# Patient Record
Sex: FEMALE | Race: WHITE | NOT HISPANIC OR LATINO | Employment: OTHER | URBAN - METROPOLITAN AREA
[De-identification: names, ages, dates, MRNs, and addresses within clinical notes are randomized per-mention and may not be internally consistent; named-entity substitution may affect disease eponyms.]

---

## 2018-09-24 ENCOUNTER — OFFICE VISIT CONVERTED (OUTPATIENT)
Dept: ORTHOPEDIC SURGERY | Facility: CLINIC | Age: 73
End: 2018-09-24
Attending: ORTHOPAEDIC SURGERY

## 2018-10-15 ENCOUNTER — OFFICE VISIT CONVERTED (OUTPATIENT)
Dept: ORTHOPEDIC SURGERY | Facility: CLINIC | Age: 73
End: 2018-10-15
Attending: ORTHOPAEDIC SURGERY

## 2018-10-15 ENCOUNTER — CONVERSION ENCOUNTER (OUTPATIENT)
Dept: ORTHOPEDIC SURGERY | Facility: CLINIC | Age: 73
End: 2018-10-15

## 2018-11-29 ENCOUNTER — OFFICE VISIT CONVERTED (OUTPATIENT)
Dept: ORTHOPEDIC SURGERY | Facility: CLINIC | Age: 73
End: 2018-11-29
Attending: ORTHOPAEDIC SURGERY

## 2018-12-31 ENCOUNTER — CONVERSION ENCOUNTER (OUTPATIENT)
Dept: ORTHOPEDIC SURGERY | Facility: CLINIC | Age: 73
End: 2018-12-31

## 2018-12-31 ENCOUNTER — OFFICE VISIT CONVERTED (OUTPATIENT)
Dept: ORTHOPEDIC SURGERY | Facility: CLINIC | Age: 73
End: 2018-12-31
Attending: ORTHOPAEDIC SURGERY

## 2019-02-11 ENCOUNTER — OFFICE VISIT CONVERTED (OUTPATIENT)
Dept: ORTHOPEDIC SURGERY | Facility: CLINIC | Age: 74
End: 2019-02-11
Attending: ORTHOPAEDIC SURGERY

## 2019-05-13 ENCOUNTER — OFFICE VISIT CONVERTED (OUTPATIENT)
Dept: ORTHOPEDIC SURGERY | Facility: CLINIC | Age: 74
End: 2019-05-13
Attending: ORTHOPAEDIC SURGERY

## 2019-09-24 ENCOUNTER — HOSPITAL ENCOUNTER (OUTPATIENT)
Dept: GENERAL RADIOLOGY | Facility: HOSPITAL | Age: 74
Discharge: HOME | End: 2019-09-24
Attending: INTERNAL MEDICINE

## 2019-11-12 ENCOUNTER — OFFICE VISIT CONVERTED (OUTPATIENT)
Dept: ORTHOPEDIC SURGERY | Facility: CLINIC | Age: 74
End: 2019-11-12
Attending: ORTHOPAEDIC SURGERY

## 2020-11-12 ENCOUNTER — CONVERSION ENCOUNTER (OUTPATIENT)
Dept: ORTHOPEDIC SURGERY | Facility: CLINIC | Age: 75
End: 2020-11-12

## 2020-11-12 ENCOUNTER — OFFICE VISIT CONVERTED (OUTPATIENT)
Dept: ORTHOPEDIC SURGERY | Facility: CLINIC | Age: 75
End: 2020-11-12
Attending: ORTHOPAEDIC SURGERY

## 2021-04-12 ENCOUNTER — OFFICE VISIT CONVERTED (OUTPATIENT)
Dept: CARDIOLOGY | Facility: CLINIC | Age: 76
End: 2021-04-12
Attending: INTERNAL MEDICINE

## 2021-05-11 NOTE — H&P
History and Physical      Patient Name: Tanya Muñoz   Patient ID: [id number]   Sex: Female   YOB: 1945    Referring Provider: Sirena BORJAS    Visit Date: April 12, 2021    Provider: Keyur Chambers MD   Location: [facility]   Location Address: [address] [place] [address], [facility]   [place], [place]  [id number]   Location Phone: [phone]          Chief Complaint     Murmur and regurgitation.       History Of Present Illness  Consult requested by: Sirena BORJAS   Tanya Muñoz is a 75 year old /White female with a history of hypothyroidism who has been having some fatigue issues. She has recently been increased on her Synthroid. She was noted to have a murmur on auscultation. The patient symptom-wise does not have any shortness of breath other than with activity. No PND or orthopnea. No chest pain problems. She has occasional heart palpitations. No syncope or presyncopal spells.   PAST MEDICAL HISTORY: Significant for hypothyroidism, hyperlipidemia, asthma, psoriasis, and osteoporosis.   FAMILY MEDICAL HISTORY: Nonsignificant for premature coronary atherosclerotic disease.   PSYCHOSOCIAL HISTORY: Denies alcohol use. Denies tobacco use. Caffeine use is 1 cup per day of coffee.   CURRENT MEDICATIONS: Levothyroxine 88 mcg daily; mometasone spray p.r.n.; Advair 100/50 as needed; simvastatin 10 mg daily; montelukast 10 mg daily; cetirizine 10 mg daily; trazodone 25 mg p.r.n.; folic acid 1 mg daily; calcium 600 mg with vitamin D 800 daily; Tylenol 650 b.i.d.   ALLERGIES: Sulfa.       Review of Systems  · Constitutional  o Admits  o : good general health lately  o Denies  o : fatigue, recent weight changes   · Eyes  o Denies  o : double vision  · HENT  o Admits  o : hearing loss or ringing  o Denies  o : chronic sinus problem, swollen glands in neck  · Cardiovascular  o Admits  o : palpitations (fast, fluttering, or skipping beats), shortness of breath while walking or lying  "flat  o Denies  o : chest pain, swelling (feet, ankles, hands)  · Respiratory  o Admits  o : chronic or frequent cough, asthma or wheezing  o Denies  o : COPD  · Gastrointestinal  o Denies  o : ulcers, nausea or vomiting  · Neurologic  o Denies  o : lightheaded or dizzy, stroke, headaches  · Musculoskeletal  o Admits  o : joint pain, back pain  · Endocrine  o Admits  o : thyroid disease  o Denies  o : diabetes, heat or cold intolerance, excessive thirst or urination  · Heme-Lymph  o Denies  o : bleeding or bruising tendency, anemia      Vitals  Date Time BP Position Site L\R Cuff Size HR RR TEMP (F) WT  HT  BMI kg/m2 BSA m2 O2 Sat FR L/min FiO2 HC       04/12/2021 09:57 /140 Sitting    54 - R   152lbs 0oz 5'  4\" 26.09 1.76       04/12/2021 09:57 /54 Sitting                       Physical Examination  · Constitutional  o Appearance  o : Awake, alert, in no acute distress.   · Head and Face  o HEENT  o : PERRLA.  · Eyes  o Conjunctivae  o : Normal.  · Ears, Nose, Mouth and Throat  o Oral Cavity  o :   § Oral Mucosa  § : Normal.  · Neck  o Inspection/Palpation  o : No JVD.  · Respiratory  o Respiratory  o : Chest is symmetrical. Lung fields are clear. No rhonchi or wheezes heard.  · Cardiovascular  o Heart  o :   § Auscultation of Heart  § : 1/6 systolic murmur best heard at the left lower sternal border.   o Peripheral Vascular System  o :   § Extremities  § : Nails normal. No clubbing or cyanosis. Femoral pulses adequate. Pedal pulses adequate. No peripheral edema.  · Gastrointestinal  o Abdominal Examination  o : Abdomen soft. No masses. No guarding or rigidity. No hepatosplenomegaly. Bowel sounds normal.  · Musculoskeletal  o General  o :   § General Musculoskeletal  § : Muscle tone and strength were normal.  · Skin and Subcutaneous Tissue  o General Inspection  o : Unremarkable.  · EKG  o EKG  o : Performed in the office today.   o Indications  o : Heart murmur.  o Results  o : Sinus bradycardia but " otherwise normal.   · Labs  o Labs  o : Creatinine 0.7, potassium 4.4, C-reactive protein 3.6, TSH 2.03.  · Echocardiogram  o Echocardiogram  o : Her echocardiogram results were reviewed by me and showed a normal ejection fraction of 69% with mild biatrial enlargement, mild to moderate aortic regurgitation, and moderate tricuspid regurgitation with normal estimated PA pressures.           Assessment     ASSESSMENT & PLAN:    1.  Aortic insufficiency, mild to moderate in severity, no overt symptoms to suggest any CHF.  Given the        severity would just repeat an echocardiogram in 3-5 years unless any other change in symptoms.   2.  Tricuspid regurgitation, moderate in severity with normal estimated PA pressures and no significant right        ventricular enlargement.  Would just repeat echocardiogram in 3-5 years. I reassured the patient of the        likely benign nature.   3.  Hypertension, likely situational and white coat related.  Previous blood pressures in PCP's office have been        in normal range.  Recommended the patient just keep track with a home blood pressure monitoring log for        review.   4.  Mild biatrial enlargement.  No clinical evidence of CHF either symptomatically or on her exam today.  Will        get a baseline proBNP level and recommend close monitoring of the patient's blood pressure.     Keyur Chambers MD  JH/rt                Electronically Signed by: Portia Ferro-, Other -Author on April 17, 2021 04:09:13 PM  Electronically Co-signed by: Keyur Chambers MD -Reviewer on April 26, 2021 09:48:48 AM

## 2021-05-13 NOTE — PROGRESS NOTES
Progress Note      Patient Name: Tanya Mñuoz   Patient ID: [id number]   Sex: Female   YOB: 1945        Visit Date: November 12, 2020    Provider: Keyur Wood MD   Location: [facility]   Location Address: [address] [place] [address]  [id number]   Location Phone: [phone]          Chief Complaint  · Left knee pain       History Of Present Illness  Tanya Muñoz is a 75 year old /White female who presents today to [facility].        The patient presents today for follow-up of the left knee replacement performed 11/14/2018. She denies knee pain today and has been doing well. She has been visiting with her family some. She does report recently passing a marble feeling of the knee with flexion.          Past Medical History  Aftercare;following joint replacement, left knee; Arthritis; Asthma; Hyperlipemia; Primary osteoarthritis of knees, bilateral; Primary osteoarthritis of left knee; Right knee pain; Thyroid disorder         Past Surgical History  Colonoscopy; Eye Implant         Medication List  diclofenac sodium 75 mg oral tablet,delayed release (DR/EC); levothyroxine 50 mcg oral tablet; methotrexate sodium 2.5 mg oral tablet; montelukast 10 mg oral tablet; omeprazole 10 mg oral capsule,delayed release(DR/EC); Tylenol 325 mg oral capsule         Allergy List  SULFA (SULFONAMIDES)       Allergies Reconciled  Family Medical History  Stroke; Cancer, Unspecified; Diabetes, unspecified type; Blood Diseases; Family history of certain chronic disabling diseases; arthritis         Social History  Alcohol Use (Never); Homemaker.; lives with spouse; .; Recreational Drug Use (Never); Retired.; Tobacco (Never)         Review of Systems  · Constitutional  o Denies  o : fever, chills, weight loss  · Cardiovascular  o Denies  o : chest pain, shortness of breath  · Gastrointestinal  o Denies  o : liver disease, heartburn, nausea, blood in  "stools  · Genitourinary  o Denies  o : painful urination, blood in urine  · Integument  o Denies  o : rash, itching  · Neurologic  o Denies  o : headache, weakness, loss of consciousness  · Musculoskeletal  o Denies  o : painful, swollen joints  · Psychiatric  o Denies  o : drug/alcohol addiction, anxiety, depression      Vitals  Date Time BP Position Site L\R Cuff Size HR RR TEMP (F) WT  HT  BMI kg/m2 BSA m2 O2 Sat FR L/min FiO2        11/12/2020 11:11 AM      86 - R   157lbs 6oz 5'  4.5\" 26.6 1.8 98 %            Physical Examination  · Constitutional  o Appearance  o : well developed, well-nourished, no obvious deformities present  · Head and Face  o Head  o :   § Inspection  § : normocephalic  o Face  o :   § Inspection  § : no facial lesions  · Eyes  o Conjunctivae  o : conjunctivae normal  o Sclerae  o : sclerae white  · Ears, Nose, Mouth and Throat  o Ears  o :   § External Ears  § : appearance within normal limits  § Hearing  § : intact  o Nose  o :   § External Nose  § : appearance normal  · Neck  o Inspection/Palpation  o : normal appearance  o Range of Motion  o : full range of motion  · Respiratory  o Respiratory Effort  o : breathing unlabored  o Inspection of Chest  o : normal appearance  o Auscultation of Lungs  o : no audible wheezing or rales  · Cardiovascular  o Heart  o : regular rate  · Gastrointestinal  o Abdominal Examination  o : soft and non-tender  · Skin and Subcutaneous Tissue  o General Inspection  o : intact, no rashes  · Psychiatric  o General  o : Alert and oriented x3  o Judgement and Insight  o : judgment and insight intact  o Mood and Affect  o : mood normal, affect appropriate  · Left Knee  o Inspection  o : ROM -3 to 115 degrees of flexion, incision well-healed, intact ankle ROM, no skin discoloration, no atrophy, no swelling, Neurovascularly intact, sensation intact, Non-tender, ambulates with minimal antalgic gait  · In Office Procedures  o View  o : " AP/LATERAL/SUNRISE  o Site  o : left, knee  o Indication  o : Left knee pain  o Study  o : X-rays ordered, taken in the office, and reviewed today.  o Xray  o : reveals an intact appearing left knee replacement, no evidence of subsidence, loosening or periprosthetic fracture  o Comparative Data  o : Comparative Data found and reviewed today           Assessment  · Aftercare;following joint replacement     V54.81/Z47.1  · Left knee pain, unspecified chronicity     719.46/M25.562      Plan  · Orders  o Knee (Left) [facility] ([id number]) - 719.46/M25.562 - 11/12/2020  · Medications  o Medications have been Reconciled  o Transition of Care or Provider Policy  · Instructions  o Reviewed the patient's Past Medical, Social, and Family history as well as the ROS at today's visit, no changes.  o Call or return if worsening symptoms.  o X-ray ordered, taken and reviewed at this visit.  o The above service was scribed by Mary Calvillo on my behalf and I attest to the accuracy of the note. abyb  o We recommended she follow-up with us in about two years to recheck the knee. If any problems she will follow-up sooner.             Electronically Signed by: Mary Calvillo-, Other -Author on November 12, 2020 11:30:42 AM  Electronically Co-signed by: Keyur Wood MD -Reviewer on November 12, 2020 09:08:51 PM

## 2021-05-14 VITALS — WEIGHT: 157.37 LBS | BODY MASS INDEX: 26.87 KG/M2 | HEIGHT: 64 IN | HEART RATE: 86 BPM | OXYGEN SATURATION: 98 %

## 2021-05-14 VITALS
WEIGHT: 152 LBS | HEIGHT: 64 IN | BODY MASS INDEX: 25.95 KG/M2 | HEART RATE: 54 BPM | SYSTOLIC BLOOD PRESSURE: 162 MMHG | DIASTOLIC BLOOD PRESSURE: 140 MMHG

## 2021-05-15 VITALS — BODY MASS INDEX: 26.8 KG/M2 | HEART RATE: 63 BPM | WEIGHT: 157 LBS | HEIGHT: 64 IN | OXYGEN SATURATION: 99 %

## 2021-05-15 VITALS — WEIGHT: 160 LBS | HEIGHT: 64 IN | HEART RATE: 70 BPM | BODY MASS INDEX: 27.31 KG/M2 | OXYGEN SATURATION: 98 %

## 2021-05-15 VITALS — WEIGHT: 158.37 LBS | HEIGHT: 64 IN | HEART RATE: 72 BPM | BODY MASS INDEX: 27.04 KG/M2 | OXYGEN SATURATION: 98 %

## 2021-05-15 VITALS — OXYGEN SATURATION: 98 % | HEART RATE: 80 BPM | WEIGHT: 162 LBS | HEIGHT: 64 IN | BODY MASS INDEX: 27.66 KG/M2

## 2021-05-16 VITALS — HEIGHT: 64 IN | BODY MASS INDEX: 27.36 KG/M2 | OXYGEN SATURATION: 98 % | HEART RATE: 63 BPM | WEIGHT: 160.25 LBS

## 2021-05-16 VITALS — BODY MASS INDEX: 44.39 KG/M2 | HEIGHT: 64 IN | WEIGHT: 260 LBS | OXYGEN SATURATION: 96 %

## 2021-05-16 VITALS — HEIGHT: 64 IN | HEART RATE: 85 BPM | OXYGEN SATURATION: 96 %

## 2022-07-12 PROBLEM — E78.5 HYPERLIPEMIA: Status: ACTIVE | Noted: 2022-07-12

## 2022-07-12 PROBLEM — I10 ESSENTIAL HYPERTENSION: Status: ACTIVE | Noted: 2022-07-12

## 2022-07-12 PROBLEM — I35.1 NONRHEUMATIC AORTIC VALVE INSUFFICIENCY: Status: ACTIVE | Noted: 2022-07-12

## 2022-07-13 ENCOUNTER — PREP FOR SURGERY (OUTPATIENT)
Dept: OTHER | Facility: HOSPITAL | Age: 77
End: 2022-07-13

## 2022-07-13 ENCOUNTER — OFFICE VISIT (OUTPATIENT)
Dept: CARDIOLOGY | Facility: CLINIC | Age: 77
End: 2022-07-13

## 2022-07-13 VITALS
HEART RATE: 86 BPM | SYSTOLIC BLOOD PRESSURE: 138 MMHG | WEIGHT: 126 LBS | BODY MASS INDEX: 21.51 KG/M2 | DIASTOLIC BLOOD PRESSURE: 81 MMHG | HEIGHT: 64 IN

## 2022-07-13 DIAGNOSIS — I10 ESSENTIAL HYPERTENSION: ICD-10-CM

## 2022-07-13 DIAGNOSIS — I48.19 ATRIAL FIBRILLATION, PERSISTENT: Primary | ICD-10-CM

## 2022-07-13 DIAGNOSIS — I35.1 NONRHEUMATIC AORTIC VALVE INSUFFICIENCY: ICD-10-CM

## 2022-07-13 DIAGNOSIS — E78.5 HYPERLIPIDEMIA, UNSPECIFIED HYPERLIPIDEMIA TYPE: ICD-10-CM

## 2022-07-13 PROBLEM — I48.0 PAROXYSMAL ATRIAL FIBRILLATION: Status: ACTIVE | Noted: 2022-07-13

## 2022-07-13 PROCEDURE — 93000 ELECTROCARDIOGRAM COMPLETE: CPT | Performed by: INTERNAL MEDICINE

## 2022-07-13 PROCEDURE — 99214 OFFICE O/P EST MOD 30 MIN: CPT | Performed by: INTERNAL MEDICINE

## 2022-07-13 RX ORDER — MOMETASONE FUROATE 50 UG/1
2 SPRAY, METERED NASAL DAILY
COMMUNITY

## 2022-07-13 RX ORDER — DILTIAZEM HYDROCHLORIDE 120 MG/1
120 TABLET, FILM COATED ORAL DAILY
COMMUNITY
End: 2022-08-10

## 2022-07-13 RX ORDER — WARFARIN SODIUM 5 MG/1
5 TABLET ORAL
COMMUNITY
End: 2023-02-23 | Stop reason: ALTCHOICE

## 2022-07-13 RX ORDER — MONTELUKAST SODIUM 10 MG/1
10 TABLET ORAL NIGHTLY
COMMUNITY

## 2022-07-13 RX ORDER — METOPROLOL SUCCINATE 25 MG/1
25 TABLET, EXTENDED RELEASE ORAL DAILY
COMMUNITY
End: 2023-02-23

## 2022-07-13 RX ORDER — TRAZODONE HYDROCHLORIDE 50 MG/1
50 TABLET ORAL NIGHTLY
COMMUNITY

## 2022-07-13 RX ORDER — CETIRIZINE HYDROCHLORIDE 10 MG/1
10 TABLET ORAL DAILY PRN
COMMUNITY

## 2022-07-13 RX ORDER — SIMVASTATIN 10 MG
10 TABLET ORAL NIGHTLY
COMMUNITY

## 2022-07-13 RX ORDER — FLUTICASONE PROPIONATE AND SALMETEROL 100; 50 UG/1; UG/1
POWDER RESPIRATORY (INHALATION) AS NEEDED
COMMUNITY
End: 2022-08-10

## 2022-07-13 RX ORDER — LEVOTHYROXINE SODIUM 0.07 MG/1
75 TABLET ORAL DAILY
COMMUNITY

## 2022-07-13 NOTE — PROGRESS NOTES
Chief Complaint  Atrial Fibrillation    Subjective    Patient is a 76-year-old with a history of hypertension dyslipidemia previous asthma who had had some respiratory issues with cough wheezing shortness of breath have been treated and not had improvement with back to see the PCP was noted to be tachycardic and a new onset of atrial fibrillation admitted to the hospital and treated for 2 days the patient was discharged a few weeks later she went back to the emergency room due to low heart rate as low as 39 and her diltiazem was adjusted.  Past Medical History:   Diagnosis Date   • Atrial fibrillation (HCC)    • Hyperlipidemia          Current Outpatient Medications:   •  cetirizine (zyrTEC) 10 MG tablet, Take 10 mg by mouth Daily As Needed for Allergies., Disp: , Rfl:   •  dilTIAZem (CARDIZEM) 120 MG tablet, Take 120 mg by mouth Daily., Disp: , Rfl:   •  levothyroxine (SYNTHROID, LEVOTHROID) 75 MCG tablet, Take 75 mcg by mouth Daily., Disp: , Rfl:   •  metoprolol succinate XL (TOPROL-XL) 25 MG 24 hr tablet, Take 25 mg by mouth Daily., Disp: , Rfl:   •  mometasone (Nasonex) 50 MCG/ACT nasal spray, 2 sprays into the nostril(s) as directed by provider Daily., Disp: , Rfl:   •  montelukast (SINGULAIR) 10 MG tablet, Take 10 mg by mouth Every Night., Disp: , Rfl:   •  simvastatin (ZOCOR) 10 MG tablet, Take 10 mg by mouth Every Night., Disp: , Rfl:   •  traZODone (DESYREL) 50 MG tablet, Take 50 mg by mouth Every Night., Disp: , Rfl:   •  warfarin (COUMADIN) 5 MG tablet, Take 5 mg by mouth Daily., Disp: , Rfl:   •  Ergocalciferol (VITAMIN D2 PO), Take 1.5 mg by mouth 1 (One) Time Per Week., Disp: , Rfl:   •  Fluticasone-Salmeterol (ADVAIR) 100-50 MCG/ACT DISKUS, Inhale As Needed., Disp: , Rfl:     There are no discontinued medications.  Allergies   Allergen Reactions   • Sulfa Antibiotics Unknown - High Severity        Social History     Tobacco Use   • Smokeless tobacco: Never Used   Vaping Use   • Vaping Use: Never used  "  Substance Use Topics   • Drug use: Never       Family History   Problem Relation Age of Onset   • Heart attack Brother    • Stroke Brother    • Heart disease Maternal Grandfather         Objective     /81   Pulse 86   Ht 162.6 cm (64\")   Wt 57.2 kg (126 lb)   BMI 21.63 kg/m²       Physical Exam    General Appearance:   · no acute distress  · Alert and oriented x3  HENT:   · lips not cyanotic  · Atraumatic  Neck:  · No jvd   · supple  Respiratory:  · no respiratory distress  · normal breath sounds  · no rales  Cardiovascular:  · Irregular irregular  · no S3, no S4   · 1/6 systolic murmur  · no rub  Extremities  · No cyanosis  · lower extremity edema: Trace  Skin:   · warm, dry  · No rashes      Result Review :     No results found for: PROBNP           No results found for: POCTROP                     ECG 12 Lead    Date/Time: 7/13/2022 4:03 PM  Performed by: Keyur Chambers MD  Authorized by: Keyur Chambers MD   Comparison: compared with previous ECG   Similar to previous ECG  Rhythm: atrial fibrillation  Comments: Borderline low voltage                   Diagnoses and all orders for this visit:    1. Atrial fibrillation, persistent (HCC) (Primary)  Assessment & Plan:  Patient with new onset of persistent atrial fibrillation some issues with bradycardia improved since her dose of diltiazem was decreased recommended a trial of cardioversion both to see if she is symptomatically improved as well as to see if her rate control is easier back in normal sinus rhythm.  She is on Coumadin and been anticoagulated we will schedule for 2 weeks as this will complete over 4 weeks of therapy and not require CELESTE prior to cardioversion.  Discussed risk benefits and alternatives including the risk for stroke respiratory sedation patient was agreeable to proceeding      2. Nonrheumatic aortic valve insufficiency    3. Hyperlipidemia, unspecified hyperlipidemia type    4. Essential hypertension  Assessment & " Plan:  Well-controlled continue on Toprol 25 and diltiazem 120 mg once      Other orders  -     ECG 12 Lead          Follow Up     No follow-ups on file.          Patient was given instructions and counseling regarding her condition or for health maintenance advice. Please see specific information pulled into the AVS if appropriate.

## 2022-08-01 ENCOUNTER — HOSPITAL ENCOUNTER (OUTPATIENT)
Dept: INFUSION THERAPY | Facility: HOSPITAL | Age: 77
Discharge: HOME | End: 2022-08-01
Attending: INTERNAL MEDICINE | Admitting: INTERNAL MEDICINE

## 2022-08-01 VITALS
RESPIRATION RATE: 18 BRPM | HEART RATE: 50 BPM | OXYGEN SATURATION: 95 % | TEMPERATURE: 97.9 F | DIASTOLIC BLOOD PRESSURE: 60 MMHG | SYSTOLIC BLOOD PRESSURE: 101 MMHG

## 2022-08-01 DIAGNOSIS — I48.19 ATRIAL FIBRILLATION, PERSISTENT: ICD-10-CM

## 2022-08-01 LAB
ANION GAP SERPL CALCULATED.3IONS-SCNC: 5.9 MMOL/L (ref 5–15)
BUN SERPL-MCNC: 13 MG/DL (ref 8–23)
BUN/CREAT SERPL: 18.6 (ref 7–25)
CALCIUM SPEC-SCNC: 9.5 MG/DL (ref 8.6–10.5)
CHLORIDE SERPL-SCNC: 108 MMOL/L (ref 98–107)
CO2 SERPL-SCNC: 28.1 MMOL/L (ref 22–29)
CREAT SERPL-MCNC: 0.7 MG/DL (ref 0.57–1)
EGFRCR SERPLBLD CKD-EPI 2021: 89.8 ML/MIN/1.73
GLUCOSE SERPL-MCNC: 89 MG/DL (ref 65–99)
INR PPP: 2.74 (ref 0.86–1.15)
POTASSIUM SERPL-SCNC: 4.2 MMOL/L (ref 3.5–5.2)
PROTHROMBIN TIME: 29.6 SECONDS (ref 11.8–14.9)
SODIUM SERPL-SCNC: 142 MMOL/L (ref 136–145)

## 2022-08-01 PROCEDURE — 99153 MOD SED SAME PHYS/QHP EA: CPT

## 2022-08-01 PROCEDURE — 99152 MOD SED SAME PHYS/QHP 5/>YRS: CPT

## 2022-08-01 PROCEDURE — 85610 PROTHROMBIN TIME: CPT | Performed by: INTERNAL MEDICINE

## 2022-08-01 PROCEDURE — 93005 ELECTROCARDIOGRAM TRACING: CPT | Performed by: INTERNAL MEDICINE

## 2022-08-01 PROCEDURE — 92960 CARDIOVERSION ELECTRIC EXT: CPT | Performed by: INTERNAL MEDICINE

## 2022-08-01 PROCEDURE — 80048 BASIC METABOLIC PNL TOTAL CA: CPT | Performed by: INTERNAL MEDICINE

## 2022-08-01 PROCEDURE — 25010000002 MIDAZOLAM PER 1 MG: Performed by: INTERNAL MEDICINE

## 2022-08-01 PROCEDURE — 92960 CARDIOVERSION ELECTRIC EXT: CPT

## 2022-08-01 RX ORDER — MIDAZOLAM HYDROCHLORIDE 1 MG/ML
2 INJECTION INTRAMUSCULAR; INTRAVENOUS AS NEEDED
Status: DISCONTINUED | OUTPATIENT
Start: 2022-08-01 | End: 2022-08-01 | Stop reason: HOSPADM

## 2022-08-01 RX ORDER — MORPHINE SULFATE 2 MG/ML
2 INJECTION, SOLUTION INTRAMUSCULAR; INTRAVENOUS AS NEEDED
Status: DISCONTINUED | OUTPATIENT
Start: 2022-08-01 | End: 2022-08-01 | Stop reason: HOSPADM

## 2022-08-01 RX ORDER — FLUMAZENIL 0.1 MG/ML
0.2 INJECTION INTRAVENOUS ONCE
Status: COMPLETED | OUTPATIENT
Start: 2022-08-01 | End: 2022-08-01

## 2022-08-01 RX ADMIN — MIDAZOLAM HYDROCHLORIDE 2 MG: 1 INJECTION, SOLUTION INTRAMUSCULAR; INTRAVENOUS at 08:21

## 2022-08-01 RX ADMIN — SODIUM CHLORIDE 500 ML: 9 INJECTION, SOLUTION INTRAVENOUS at 08:56

## 2022-08-01 RX ADMIN — FLUMAZENIL 0.2 MG: 0.1 INJECTION, SOLUTION INTRAVENOUS at 08:37

## 2022-08-01 NOTE — PROCEDURES
[facility]   CARDIOVERSION PROCEDURE REPORT      Patient: Tanya Muñoz  : 1945  MRN: [id number]    Procedure Date:  22          Indication:  1. Paroxysmal atrial fibrillation, symptomatic    Procedure performed: The patient signed a written informed consent and was brought to a monitored telemetry bed. They were given IV conscious sedation per the nursing notes. They underwent synchronized DC cardioversion with 200 joules which was successful in converting to normal sinus rhythm. The patient was monitored afterwards for an uneventful course. They were no immediate complications.      Conclusions: Successful DC cardioversion    Recommendations:   1. Continue with her current rate control medications patient can follow-up as outpatient.        Keyur Chambers MD    22  08:36 EDT

## 2022-08-10 ENCOUNTER — OFFICE VISIT (OUTPATIENT)
Dept: CARDIOLOGY | Facility: CLINIC | Age: 77
End: 2022-08-10

## 2022-08-10 VITALS
BODY MASS INDEX: 21.03 KG/M2 | HEIGHT: 64 IN | SYSTOLIC BLOOD PRESSURE: 147 MMHG | HEART RATE: 58 BPM | DIASTOLIC BLOOD PRESSURE: 52 MMHG | WEIGHT: 123.2 LBS

## 2022-08-10 DIAGNOSIS — E78.5 HYPERLIPIDEMIA, UNSPECIFIED HYPERLIPIDEMIA TYPE: ICD-10-CM

## 2022-08-10 DIAGNOSIS — I10 ESSENTIAL HYPERTENSION: ICD-10-CM

## 2022-08-10 DIAGNOSIS — I35.1 NONRHEUMATIC AORTIC VALVE INSUFFICIENCY: ICD-10-CM

## 2022-08-10 DIAGNOSIS — I48.0 PAROXYSMAL ATRIAL FIBRILLATION: Primary | ICD-10-CM

## 2022-08-10 PROCEDURE — 99214 OFFICE O/P EST MOD 30 MIN: CPT | Performed by: INTERNAL MEDICINE

## 2022-08-10 RX ORDER — BUDESONIDE AND FORMOTEROL FUMARATE DIHYDRATE 80; 4.5 UG/1; UG/1
2 AEROSOL RESPIRATORY (INHALATION)
COMMUNITY

## 2022-08-10 NOTE — ASSESSMENT & PLAN NOTE
Maintaining normal sinus rhythm but still with fatigability improving mildly recommended titrating down on her Toprol to 12.5 and continue to encourage exercise as tolerated check EKG on follow-up

## 2022-08-10 NOTE — ASSESSMENT & PLAN NOTE
Blood pressure on her home log well-controlled titrate down on metoprolol to see if this improves with her fatigue symptoms

## 2022-08-10 NOTE — PROGRESS NOTES
Chief Complaint  Atrial Fibrillation, Hyperlipidemia, and Hypertension    Subjective    Patient is status post cardioversion maintaining normal sinus rhythm on EKG today but has persistently had some fatigue and not much change in her exertional capacity although it is mildly improved    Past Medical History:   Diagnosis Date   • Asthma    • Atrial fibrillation (HCC)    • Disease of thyroid gland    • Hyperlipidemia          Current Outpatient Medications:   •  budesonide-formoterol (Symbicort) 80-4.5 MCG/ACT inhaler, Inhale 2 puffs 2 (Two) Times a Day., Disp: , Rfl:   •  cetirizine (zyrTEC) 10 MG tablet, Take 10 mg by mouth Daily As Needed for Allergies., Disp: , Rfl:   •  Ergocalciferol (VITAMIN D2 PO), Take 1.5 mg by mouth 1 (One) Time Per Week., Disp: , Rfl:   •  levothyroxine (SYNTHROID, LEVOTHROID) 75 MCG tablet, Take 75 mcg by mouth Daily., Disp: , Rfl:   •  metoprolol succinate XL (TOPROL-XL) 25 MG 24 hr tablet, Take 25 mg by mouth Daily., Disp: , Rfl:   •  mometasone (NASONEX) 50 MCG/ACT nasal spray, 2 sprays into the nostril(s) as directed by provider Daily., Disp: , Rfl:   •  montelukast (SINGULAIR) 10 MG tablet, Take 10 mg by mouth Every Night., Disp: , Rfl:   •  simvastatin (ZOCOR) 10 MG tablet, Take 10 mg by mouth Every Night., Disp: , Rfl:   •  traZODone (DESYREL) 50 MG tablet, Take 50 mg by mouth Every Night., Disp: , Rfl:   •  warfarin (COUMADIN) 5 MG tablet, Take 5 mg by mouth Daily., Disp: , Rfl:     Medications Discontinued During This Encounter   Medication Reason   • dilTIAZem (CARDIZEM) 120 MG tablet *Therapy completed   • Fluticasone-Salmeterol (ADVAIR) 100-50 MCG/ACT DISKUS *Therapy completed     Allergies   Allergen Reactions   • Sulfa Antibiotics Rash        Social History     Tobacco Use   • Smoking status: Never Smoker   • Smokeless tobacco: Never Used   Vaping Use   • Vaping Use: Never used   Substance Use Topics   • Alcohol use: Never   • Drug use: Never       Family History   Problem  "Relation Age of Onset   • Heart attack Brother    • Stroke Brother    • Heart disease Maternal Grandfather         Objective     /52   Pulse 58   Ht 162.6 cm (64\")   Wt 55.9 kg (123 lb 3.2 oz)   BMI 21.15 kg/m²       Physical Exam    General Appearance:   · no acute distress  · Alert and oriented x3  HENT:   · lips not cyanotic  · Atraumatic  Neck:  · No jvd   · supple  Respiratory:  · no respiratory distress  · normal breath sounds  · no rales  Cardiovascular:  · Regular rate and rhythm  · no S3, no S4   · no murmur  · no rub  Extremities  · No cyanosis  · lower extremity edema: none    Skin:   · warm, dry  · No rashes      Result Review :     No results found for: PROBNP  CMP    CMP 8/1/22   Glucose 89   BUN 13   Creatinine 0.70   Sodium 142   Potassium 4.2   Chloride 108 (A)   Calcium 9.5   (A) Abnormal value               No results found for: TSH   No results found for: FREET4   No results found for: DDIMERQUANT  No results found for: MG   No results found for: DIGOXIN   No results found for: TROPONINT          No results found for: POCTROP                   Diagnoses and all orders for this visit:    1. Paroxysmal atrial fibrillation (HCC) (Primary)  Assessment & Plan:  Maintaining normal sinus rhythm but still with fatigability improving mildly recommended titrating down on her Toprol to 12.5 and continue to encourage exercise as tolerated check EKG on follow-up      2. Hyperlipidemia, unspecified hyperlipidemia type    3. Nonrheumatic aortic valve insufficiency    4. Essential hypertension  Assessment & Plan:  Blood pressure on her home log well-controlled titrate down on metoprolol to see if this improves with her fatigue symptoms            Follow Up     Return in about 6 months (around 2/10/2023) for EKG with F/U.          Patient was given instructions and counseling regarding her condition or for health maintenance advice. Please see specific information pulled into the AVS if appropriate. "

## 2022-08-12 LAB — QT INTERVAL: 404 MS

## 2022-11-15 ENCOUNTER — OFFICE VISIT (OUTPATIENT)
Dept: ORTHOPEDIC SURGERY | Facility: CLINIC | Age: 77
End: 2022-11-15

## 2022-11-15 VITALS — OXYGEN SATURATION: 95 % | WEIGHT: 124.8 LBS | HEART RATE: 70 BPM | BODY MASS INDEX: 21.31 KG/M2 | HEIGHT: 64 IN

## 2022-11-15 DIAGNOSIS — Z96.652 HISTORY OF LEFT KNEE REPLACEMENT: ICD-10-CM

## 2022-11-15 DIAGNOSIS — M25.562 LEFT KNEE PAIN, UNSPECIFIED CHRONICITY: Primary | ICD-10-CM

## 2022-11-15 PROCEDURE — 99213 OFFICE O/P EST LOW 20 MIN: CPT | Performed by: ORTHOPAEDIC SURGERY

## 2022-11-15 RX ORDER — ALBUTEROL SULFATE 90 UG/1
AEROSOL, METERED RESPIRATORY (INHALATION) EVERY 4 HOURS
COMMUNITY

## 2022-11-15 NOTE — PROGRESS NOTES
"Chief Complaint  Follow-up of the Left Knee     Subjective      Tanya KRISTIAN Muñoz presents to [facility] for follow up evaluation of the left knee. The patient is S/P Left Total Knee Arthroplasty 11/14/2018. She is overall doing well. She denies any complaints. She is here for her yearly check.     Allergies   Allergen Reactions   • Sulfa Antibiotics Rash        Social History     Socioeconomic History   • Marital status:    Tobacco Use   • Smoking status: Never   • Smokeless tobacco: Never   Vaping Use   • Vaping Use: Never used   Substance and Sexual Activity   • Alcohol use: Never   • Drug use: Never   • Sexual activity: Defer        Review of Systems     Objective   Vital Signs:   Pulse 70   Ht 162.6 cm (64\")   Wt 56.6 kg (124 lb 12.8 oz)   SpO2 95%   BMI 21.42 kg/m²       Physical Exam  Constitutional:       Appearance: Normal appearance. The patient is well-developed and normal weight.   HENT:      Head: Normocephalic.      Right Ear: Hearing and external ear normal.      Left Ear: Hearing and external ear normal.      Nose: Nose normal.   Eyes:      Conjunctiva/sclera: Conjunctivae normal.   Cardiovascular:      Rate and Rhythm: Normal rate.   Pulmonary:      Effort: Pulmonary effort is normal.      Breath sounds: No wheezing or rales.   Abdominal:      Palpations: Abdomen is soft.      Tenderness: There is no abdominal tenderness.   Musculoskeletal:      Cervical back: Normal range of motion.   Skin:     Findings: No rash.   Neurological:      Mental Status: The patient is alert and oriented to person, place, and time.   Psychiatric:         Mood and Affect: Mood and affect normal.         Judgment: Judgment normal.       Ortho Exam      Left knee- ROM -5 to 130 degrees. Well healed scar. Neurovascularly intact. Stable to varus/valgus stress. Stable to anterior/posterior drawer. Positive EHL, FHL, GS and TA. Sensation intact to all 5 nerves of the foot. Positive " pulses. Knee Extensor Mechanism  Intact. Patella well tracking. Calf soft.     Procedures    X-Ray Report:  Left knee(s) X-Ray  Indication: Evaluation of left knee pain  AP/Lateral and [place] view(s)  Findings: intact left knee replacement, no signs of loosening, subsidence or periprosthetic fracture.   Prior studies available for comparison: yes         Imaging Results (Most Recent)     Procedure Component Value Units Date/Time    XR Knee 3 View Left [[id number]] Resulted: 11/15/22 1104     Updated: 11/15/22 1109           Result Review :       No results found.           Assessment and Plan     Diagnoses and all orders for this visit:    1. Left knee pain, unspecified chronicity (Primary)  -     XR Knee 3 View Left    2. History of left knee replacement        Discussed the treatment plan with the patient.  Plan to continue with activity as tolerated.     Educated on risk of smoking. Discussed options for smoking cessation.   Call or return if worsening symptoms.    Follow Up     2 year      Patient was given instructions and counseling regarding her condition or for health maintenance advice. Please see specific information pulled into the AVS if appropriate.     Scribed for Keyur Wood MD by Dilcia Amezcua.  11/15/22   11:18 EST    I have personally performed the services described in this document as scribed by the above individual and it is both accurate and complete. Keyur Wood MD 11/15/22

## 2023-02-21 ENCOUNTER — TELEPHONE (OUTPATIENT)
Dept: CARDIOLOGY | Facility: CLINIC | Age: 78
End: 2023-02-21
Payer: MEDICARE

## 2023-02-21 NOTE — TELEPHONE ENCOUNTER
This patient called to report possibly being in Afib. She saw her PCP today and visit note and EKG are being sent to us. Patient also reports being lightheaded and fatigued. Patient has an appointment with Dr. Chambers on 02/23/23.

## 2023-02-23 ENCOUNTER — OFFICE VISIT (OUTPATIENT)
Dept: CARDIOLOGY | Facility: CLINIC | Age: 78
End: 2023-02-23
Payer: MEDICARE

## 2023-02-23 VITALS
SYSTOLIC BLOOD PRESSURE: 145 MMHG | WEIGHT: 125 LBS | DIASTOLIC BLOOD PRESSURE: 50 MMHG | BODY MASS INDEX: 21.34 KG/M2 | HEIGHT: 64 IN | HEART RATE: 44 BPM

## 2023-02-23 DIAGNOSIS — I48.0 PAROXYSMAL ATRIAL FIBRILLATION: Primary | ICD-10-CM

## 2023-02-23 DIAGNOSIS — I10 ESSENTIAL HYPERTENSION: ICD-10-CM

## 2023-02-23 PROCEDURE — 99214 OFFICE O/P EST MOD 30 MIN: CPT | Performed by: INTERNAL MEDICINE

## 2023-02-23 NOTE — PROGRESS NOTES
Chief Complaint  Follow-up, Atrial Fibrillation, Hypertension, and Hyperlipidemia    Subjective    Patient has been experiencing some intermittent dizziness spells.  She reports heart palpitations which were ongoing for last few months the dizziness does appear to be more significant with standing.  She has not had any overt syncope.  Denies any change in her breathing capacity    Past Medical History:   Diagnosis Date   • Asthma    • Atrial fibrillation (HCC)    • Disease of thyroid gland    • Hyperlipidemia          Current Outpatient Medications:   •  albuterol sulfate  (90 Base) MCG/ACT inhaler, Every 4 (Four) Hours., Disp: , Rfl:   •  budesonide-formoterol (SYMBICORT) 80-4.5 MCG/ACT inhaler, Inhale 2 puffs 2 (Two) Times a Day., Disp: , Rfl:   •  cetirizine (zyrTEC) 10 MG tablet, Take 10 mg by mouth Daily As Needed for Allergies., Disp: , Rfl:   •  Ergocalciferol (VITAMIN D2 PO), Take 1.5 mg by mouth 1 (One) Time Per Week., Disp: , Rfl:   •  levothyroxine (SYNTHROID, LEVOTHROID) 75 MCG tablet, Take 75 mcg by mouth Daily., Disp: , Rfl:   •  mometasone (NASONEX) 50 MCG/ACT nasal spray, 2 sprays into the nostril(s) as directed by provider Daily., Disp: , Rfl:   •  montelukast (SINGULAIR) 10 MG tablet, Take 10 mg by mouth Every Night., Disp: , Rfl:   •  rivaroxaban (XARELTO) 20 MG tablet, Take 20 mg by mouth Daily., Disp: , Rfl:   •  simvastatin (ZOCOR) 10 MG tablet, Take 10 mg by mouth Every Night., Disp: , Rfl:   •  traZODone (DESYREL) 50 MG tablet, Take 50 mg by mouth Every Night., Disp: , Rfl:     Medications Discontinued During This Encounter   Medication Reason   • warfarin (COUMADIN) 5 MG tablet Alternate therapy   • metoprolol succinate XL (TOPROL-XL) 25 MG 24 hr tablet      Allergies   Allergen Reactions   • Sulfa Antibiotics Rash        Social History     Tobacco Use   • Smoking status: Never   • Smokeless tobacco: Never   Vaping Use   • Vaping Use: Never used   Substance Use Topics   • Alcohol use:  "Never   • Drug use: Never       Family History   Problem Relation Age of Onset   • Heart attack Brother    • Stroke Brother    • Heart disease Maternal Grandfather         Objective     /50   Pulse (!) 44   Ht 162.6 cm (64\")   Wt 56.7 kg (125 lb)   BMI 21.46 kg/m²       Physical Exam    General Appearance:   · no acute distress  · Alert and oriented x3  HENT:   · lips not cyanotic  · Atraumatic  Neck:  · No jvd   · supple  Respiratory:  · no respiratory distress  · normal breath sounds  · no rales  Cardiovascular:  · Irregularly irregular  · no S3, no S4   · no murmur  · no rub  Extremities  · No cyanosis  · lower extremity edema: none    Skin:   · warm, dry  · No rashes      Result Review :     No results found for: PROBNP  CMP    CMP 8/1/22   Glucose 89   BUN 13   Creatinine 0.70   eGFR 89.8   Sodium 142   Potassium 4.2   Chloride 108 (A)   Calcium 9.5   BUN/Creatinine Ratio 18.6   Anion Gap 5.9   (A) Abnormal value       Comments are available for some flowsheets but are not being displayed.                                        Diagnoses and all orders for this visit:    1. Paroxysmal atrial fibrillation (HCC) (Primary)  Assessment & Plan:  Patient with recurrent atrial fibrillation rate on the slower side ventricularly suspect this may be the cause of the dizziness.  Feel unlikely that repeat cardioversion would likely have any sustainable benefit in maintaining normal sinus rhythm at this point favor discontinuing Toprol and getting 48-hour Holter monitor to assess heart rate trend.  We will have her follow back up in a month to see how she is doing symptomatically.  Did instruct her to continue with her Xarelto and to take with a meal    Orders:  -     Holter Monitor - 48 Hour; Future    2. Essential hypertension  -     Holter Monitor - 48 Hour; Future          Follow Up     Return in about 4 weeks (around 3/23/2023).          Patient was given instructions and counseling regarding her condition or " for health maintenance advice. Please see specific information pulled into the AVS if appropriate.

## 2023-02-23 NOTE — ASSESSMENT & PLAN NOTE
Patient with recurrent atrial fibrillation rate on the slower side ventricularly suspect this may be the cause of the dizziness.  Feel unlikely that repeat cardioversion would likely have any sustainable benefit in maintaining normal sinus rhythm at this point favor discontinuing Toprol and getting 48-hour Holter monitor to assess heart rate trend.  We will have her follow back up in a month to see how she is doing symptomatically.  Did instruct her to continue with her Xarelto and to take with a meal

## 2023-03-10 ENCOUNTER — TELEPHONE (OUTPATIENT)
Dept: CARDIOLOGY | Facility: CLINIC | Age: 78
End: 2023-03-10
Payer: MEDICARE

## 2023-03-10 RX ORDER — METOPROLOL SUCCINATE 25 MG/1
12.5 TABLET, EXTENDED RELEASE ORAL DAILY
Qty: 45 TABLET | Refills: 3 | Status: SHIPPED | OUTPATIENT
Start: 2023-03-10

## 2023-03-10 NOTE — TELEPHONE ENCOUNTER
----- Message from SUAD Steele sent at 3/9/2023  4:43 PM EST -----  Notify pt holter result: Baseline normal sinus rhythm average heart rate 65  Maximal rate 121/Minimal rate 44  PACs 2345 (less than 1% of all beats) can be felt as palpitations, limit caffeine intake  SVT 16 brief episodes, the longest which was 10 beats long, rate in the 160s to 170s  Start Toprol XL 12.5 mg daily. Follow up as scehduled

## 2023-03-23 ENCOUNTER — OFFICE VISIT (OUTPATIENT)
Dept: CARDIOLOGY | Facility: CLINIC | Age: 78
End: 2023-03-23
Payer: MEDICARE

## 2023-03-23 VITALS
HEART RATE: 49 BPM | DIASTOLIC BLOOD PRESSURE: 45 MMHG | SYSTOLIC BLOOD PRESSURE: 124 MMHG | WEIGHT: 125 LBS | HEIGHT: 64 IN | BODY MASS INDEX: 21.34 KG/M2

## 2023-03-23 DIAGNOSIS — I48.11 LONGSTANDING PERSISTENT ATRIAL FIBRILLATION: Primary | ICD-10-CM

## 2023-03-23 PROCEDURE — 99213 OFFICE O/P EST LOW 20 MIN: CPT | Performed by: INTERNAL MEDICINE

## 2023-03-23 PROCEDURE — 3078F DIAST BP <80 MM HG: CPT | Performed by: INTERNAL MEDICINE

## 2023-03-23 PROCEDURE — 3074F SYST BP LT 130 MM HG: CPT | Performed by: INTERNAL MEDICINE

## 2023-03-23 NOTE — PROGRESS NOTES
Chief Complaint  Follow-up, Atrial Fibrillation, Hypertension, and Hyperlipidemia    Subjective    Patient follows up today still with some fatigue issues as well as some occasional intermittent dizziness but no significant limiting problems.  Reports stable breathing capacity  Past Medical History:   Diagnosis Date   • Asthma    • Atrial fibrillation (HCC)    • Disease of thyroid gland    • Hyperlipidemia          Current Outpatient Medications:   •  albuterol sulfate  (90 Base) MCG/ACT inhaler, Every 4 (Four) Hours., Disp: , Rfl:   •  budesonide-formoterol (SYMBICORT) 80-4.5 MCG/ACT inhaler, Inhale 2 puffs 2 (Two) Times a Day., Disp: , Rfl:   •  cetirizine (zyrTEC) 10 MG tablet, Take 1 tablet by mouth Daily As Needed for Allergies., Disp: , Rfl:   •  Ergocalciferol (VITAMIN D2 PO), Take 1.5 mg by mouth 1 (One) Time Per Week., Disp: , Rfl:   •  levothyroxine (SYNTHROID, LEVOTHROID) 75 MCG tablet, Take 1 tablet by mouth Daily., Disp: , Rfl:   •  metoprolol succinate XL (TOPROL-XL) 25 MG 24 hr tablet, Take 0.5 tablets by mouth Daily., Disp: 45 tablet, Rfl: 3  •  mometasone (NASONEX) 50 MCG/ACT nasal spray, 2 sprays into the nostril(s) as directed by provider Daily., Disp: , Rfl:   •  montelukast (SINGULAIR) 10 MG tablet, Take 1 tablet by mouth Every Night., Disp: , Rfl:   •  rivaroxaban (XARELTO) 20 MG tablet, Take 1 tablet by mouth Daily., Disp: , Rfl:   •  simvastatin (ZOCOR) 10 MG tablet, Take 1 tablet by mouth Every Night., Disp: , Rfl:   •  traZODone (DESYREL) 50 MG tablet, Take 1 tablet by mouth Every Night., Disp: , Rfl:     There are no discontinued medications.  Allergies   Allergen Reactions   • Sulfa Antibiotics Rash        Social History     Tobacco Use   • Smoking status: Never   • Smokeless tobacco: Never   Vaping Use   • Vaping Use: Never used   Substance Use Topics   • Alcohol use: Never   • Drug use: Never       Family History   Problem Relation Age of Onset   • Heart attack Brother    •  "Stroke Brother    • Heart disease Maternal Grandfather         Objective     /45   Pulse (!) 49   Ht 162.6 cm (64\")   Wt 56.7 kg (125 lb)   BMI 21.46 kg/m²       Physical Exam    General Appearance:   · no acute distress  · Alert and oriented x3  HENT:   · lips not cyanotic  · Atraumatic  Neck:  · No jvd   · supple  Respiratory:  · no respiratory distress  · normal breath sounds  · no rales  Cardiovascular:  · Irregular irregular  · no S3, no S4   · no murmur  · no rub  Extremities  · No cyanosis  · lower extremity edema: none    Skin:   · warm, dry  · No rashes      Result Review :    No results found for: PROBNP  CMP    CMP 8/1/22   Glucose 89   BUN 13   Creatinine 0.70   eGFR 89.8   Sodium 142   Potassium 4.2   Chloride 108 (A)   Calcium 9.5   BUN/Creatinine Ratio 18.6   Anion Gap 5.9   (A) Abnormal value       Comments are available for some flowsheets but are not being displayed.              No results found for: TSH   No results found for: FREET4   No results found for: DDIMERQUANT  No results found for: MG   No results found for: DIGOXIN   No results found for: TROPONINT          No results found for: POCTROP                   Diagnoses and all orders for this visit:    1. Longstanding persistent atrial fibrillation (HCC) (Primary)  Assessment & Plan:  Patient currently rate controlled on Toprol 12.5 mg once a day and Xarelto 20 daily dosing.  Discussed with her rate versus rhythm control with her previous cardioversion she did not really seem to change any symptoms to suggest an aggressive strategy with consideration for ablation procedure for rhythm management.  On her monitor her heart rate trend appears to be normal with no significant bradycardia so do not feel that her heart rate is currently contributing to any of her fatigue issues may be more age and other noncardiac factors.  Continue with Xarelto 20 daily for CVA prevention after discussion strategies she prefer just to stay on her " current medications of Toprol 12.5 for rate control strategy            Follow Up     Return in about 6 months (around 9/23/2023).          Patient was given instructions and counseling regarding her condition or for health maintenance advice. Please see specific information pulled into the AVS if appropriate.

## 2023-03-23 NOTE — ASSESSMENT & PLAN NOTE
Patient currently rate controlled on Toprol 12.5 mg once a day and Xarelto 20 daily dosing.  Discussed with her rate versus rhythm control with her previous cardioversion she did not really seem to change any symptoms to suggest an aggressive strategy with consideration for ablation procedure for rhythm management.  On her monitor her heart rate trend appears to be normal with no significant bradycardia so do not feel that her heart rate is currently contributing to any of her fatigue issues may be more age and other noncardiac factors.  Continue with Xarelto 20 daily for CVA prevention after discussion strategies she prefer just to stay on her current medications of Toprol 12.5 for rate control strategy

## 2023-10-19 ENCOUNTER — OFFICE VISIT (OUTPATIENT)
Dept: CARDIOLOGY | Facility: CLINIC | Age: 78
End: 2023-10-19
Payer: MEDICARE

## 2023-10-19 VITALS
HEART RATE: 48 BPM | HEIGHT: 64 IN | DIASTOLIC BLOOD PRESSURE: 49 MMHG | WEIGHT: 126 LBS | SYSTOLIC BLOOD PRESSURE: 137 MMHG | BODY MASS INDEX: 21.51 KG/M2

## 2023-10-19 DIAGNOSIS — I48.11 LONGSTANDING PERSISTENT ATRIAL FIBRILLATION: Primary | ICD-10-CM

## 2023-10-19 DIAGNOSIS — I35.1 NONRHEUMATIC AORTIC VALVE INSUFFICIENCY: ICD-10-CM

## 2023-10-19 DIAGNOSIS — I10 ESSENTIAL HYPERTENSION: ICD-10-CM

## 2023-10-19 PROCEDURE — 99214 OFFICE O/P EST MOD 30 MIN: CPT | Performed by: INTERNAL MEDICINE

## 2023-10-19 PROCEDURE — 3078F DIAST BP <80 MM HG: CPT | Performed by: INTERNAL MEDICINE

## 2023-10-19 PROCEDURE — 3075F SYST BP GE 130 - 139MM HG: CPT | Performed by: INTERNAL MEDICINE

## 2023-10-19 NOTE — PROGRESS NOTES
Chief Complaint  Follow-up, Atrial Fibrillation, Hypertension, and Hyperlipidemia    Subjective    Patient denies any complaints of tachycardia.  She has not had any presyncope or syncopal episode stable breathing capacity  Past Medical History:   Diagnosis Date    Asthma     Atrial fibrillation     Disease of thyroid gland     Hyperlipidemia          Current Outpatient Medications:     albuterol sulfate  (90 Base) MCG/ACT inhaler, Every 4 (Four) Hours., Disp: , Rfl:     budesonide-formoterol (SYMBICORT) 80-4.5 MCG/ACT inhaler, Inhale 2 puffs 2 (Two) Times a Day., Disp: , Rfl:     cetirizine (zyrTEC) 10 MG tablet, Take 1 tablet by mouth Daily As Needed for Allergies., Disp: , Rfl:     Ergocalciferol (VITAMIN D2 PO), Take 1.5 mg by mouth 1 (One) Time Per Week., Disp: , Rfl:     levothyroxine (SYNTHROID, LEVOTHROID) 75 MCG tablet, Take 1 tablet by mouth Daily., Disp: , Rfl:     mometasone (NASONEX) 50 MCG/ACT nasal spray, 2 sprays into the nostril(s) as directed by provider Daily., Disp: , Rfl:     montelukast (SINGULAIR) 10 MG tablet, Take 1 tablet by mouth Every Night., Disp: , Rfl:     rivaroxaban (XARELTO) 20 MG tablet, Take 1 tablet by mouth Daily., Disp: , Rfl:     simvastatin (ZOCOR) 10 MG tablet, Take 1 tablet by mouth Every Night., Disp: , Rfl:     traZODone (DESYREL) 50 MG tablet, Take 1 tablet by mouth Every Night., Disp: , Rfl:     Medications Discontinued During This Encounter   Medication Reason    metoprolol succinate XL (TOPROL-XL) 25 MG 24 hr tablet      Allergies   Allergen Reactions    Sulfa Antibiotics Rash        Social History     Tobacco Use    Smoking status: Never    Smokeless tobacco: Never   Vaping Use    Vaping Use: Never used   Substance Use Topics    Alcohol use: Never    Drug use: Never       Family History   Problem Relation Age of Onset    Heart attack Brother     Stroke Brother     Heart disease Maternal Grandfather         Objective     /49   Pulse (!) 48   Ht 162.6 cm  "(64\")   Wt 57.2 kg (126 lb)   BMI 21.63 kg/m²       Physical Exam    General Appearance:   no acute distress  Alert and oriented x3  HENT:   lips not cyanotic  Atraumatic  Neck:  No jvd   supple  Respiratory:  no respiratory distress  normal breath sounds  no rales  Cardiovascular:  Irregularly irregular  no S3, no S4   no murmur  no rub  Extremities  No cyanosis  lower extremity edema: none    Skin:   warm, dry  No rashes      Result Review :     No results found for: \"PROBNP\"       No results found for: \"TSH\"   No results found for: \"FREET4\"   No results found for: \"DDIMERQUANT\"  No results found for: \"MG\"   No results found for: \"DIGOXIN\"   No results found for: \"TROPONINT\"          No results found for: \"POCTROP\"                   Diagnoses and all orders for this visit:    1. Longstanding persistent atrial fibrillation (Primary)  Assessment & Plan:  Patient with mild increased ventricular rate recommended discontinue metoprolol.  She can continue with Xarelto 20 daily as long as this is affordable if not may need to consider other alternatives      2. Nonrheumatic aortic valve insufficiency    3. Essential hypertension  Assessment & Plan:  Blood pressures well controlled recommended just discontinuing Aldactone therapy at this point              Follow Up     Return in about 6 months (around 4/19/2024) for EKG with F/U, Follow with Carlotta Dumont.          Patient was given instructions and counseling regarding her condition or for health maintenance advice. Please see specific information pulled into the AVS if appropriate.       "

## 2023-10-19 NOTE — ASSESSMENT & PLAN NOTE
Patient with mild increased ventricular rate recommended discontinue metoprolol.  She can continue with Xarelto 20 daily as long as this is affordable if not may need to consider other alternatives

## 2023-11-21 ENCOUNTER — OFFICE VISIT (OUTPATIENT)
Dept: ORTHOPEDIC SURGERY | Facility: CLINIC | Age: 78
End: 2023-11-21
Payer: MEDICARE

## 2023-11-21 VITALS
HEART RATE: 60 BPM | OXYGEN SATURATION: 94 % | DIASTOLIC BLOOD PRESSURE: 81 MMHG | HEIGHT: 64 IN | BODY MASS INDEX: 21.34 KG/M2 | WEIGHT: 125 LBS | SYSTOLIC BLOOD PRESSURE: 148 MMHG

## 2023-11-21 DIAGNOSIS — Z96.652 HISTORY OF LEFT KNEE REPLACEMENT: ICD-10-CM

## 2023-11-21 DIAGNOSIS — M25.562 LEFT KNEE PAIN, UNSPECIFIED CHRONICITY: Primary | ICD-10-CM

## 2023-11-21 NOTE — PROGRESS NOTES
"Chief Complaint  Follow-up of the Left Knee     Subjective      Tanya Muñoz presents to [facility] for  follow up evaluation of the left knee. The patient is S/P Left Total Knee Arthroplasty 11/14/2018. She is overall doing well. She denies any complaints. She is here for her yearly check.      Allergies   Allergen Reactions    Sulfa Antibiotics Rash        Social History     Socioeconomic History    Marital status:    Tobacco Use    Smoking status: Never    Smokeless tobacco: Never   Vaping Use    Vaping Use: Never used   Substance and Sexual Activity    Alcohol use: Never    Drug use: Never    Sexual activity: Defer        I reviewed the patient's chief complaint, history of present illness, review of systems, past medical history, surgical history, family history, social history, medications, and allergy list.     Review of Systems     Constitutional: Denies fevers, chills, weight loss  Cardiovascular: Denies chest pain, shortness of breath  Skin: Denies rashes, acute skin changes  Neurologic: Denies headache, loss of consciousness  MSK: left knee pain      Vital Signs:   /81   Pulse 60   Ht 162.6 cm (64\")   Wt 56.7 kg (125 lb)   SpO2 94%   BMI 21.46 kg/m²          Physical Exam  General: Alert. No acute distress    Ortho Exam      Left knee-  ROM -3 to 125 degrees. Well healed scar. Neurovascularly intact. Stable to varus/valgus stress. Stable to anterior/posterior drawer. Positive EHL, FHL, GS and TA. Sensation intact to all 5 nerves of the foot. Positive pulses. Knee Extensor Mechanism  Intact. Patella well tracking. Calf soft.     Procedures    X-Ray Report:  Left knee(s) X-Ray  Indication: Evaluation of left knee pain  AP/Lateral and [place] view(s)  Findings: intact left knee replacement, no signs of loosening, subsidence or periprosthetic fracture.   Prior studies available for comparison: yes    Imaging Results (Most Recent)       Procedure Component " Value Units Date/Time    XR Knee 3 View Left [[id number]] Resulted: 11/21/23 1400     Updated: 11/21/23 1407             Result Review :       No results found.           Assessment and Plan     Diagnoses and all orders for this visit:    1. Left knee pain, unspecified chronicity (Primary)  -     XR Knee 3 View Left    2. History of left knee replacement        Discussed the treatment plan with the patient.  I reviewed the x-rays that were obtained today with the patient. Plan for activity as tolerated.     Call or return if worsening symptoms.    Follow Up     2 years      Patient was given instructions and counseling regarding her condition or for health maintenance advice. Please see specific information pulled into the AVS if appropriate.     Scribed for Keyur Wood MD by Dilcia Amezcua.  11/21/23   13:59 EST    I have personally performed the services described in this document as scribed by the above individual and it is both accurate and complete. Keyur Wood MD 11/22/23

## 2024-04-22 ENCOUNTER — OFFICE VISIT (OUTPATIENT)
Dept: CARDIOLOGY | Facility: CLINIC | Age: 79
End: 2024-04-22
Payer: MEDICARE

## 2024-04-22 VITALS
BODY MASS INDEX: 21.34 KG/M2 | SYSTOLIC BLOOD PRESSURE: 128 MMHG | HEART RATE: 62 BPM | WEIGHT: 125 LBS | DIASTOLIC BLOOD PRESSURE: 53 MMHG | HEIGHT: 64 IN

## 2024-04-22 DIAGNOSIS — I48.11 LONGSTANDING PERSISTENT ATRIAL FIBRILLATION: Primary | ICD-10-CM

## 2024-04-22 DIAGNOSIS — I10 ESSENTIAL HYPERTENSION: ICD-10-CM

## 2024-04-22 DIAGNOSIS — I35.1 MILD AORTIC REGURGITATION: ICD-10-CM

## 2024-04-22 PROBLEM — L40.0 PLAQUE PSORIASIS: Status: ACTIVE | Noted: 2017-05-08

## 2024-04-22 PROBLEM — J45.909 ASTHMA: Status: ACTIVE | Noted: 2017-05-08

## 2024-04-22 PROBLEM — E55.9 VITAMIN D DEFICIENCY: Status: ACTIVE | Noted: 2018-09-18

## 2024-04-22 PROBLEM — Z96.652 HISTORY OF TOTAL LEFT KNEE REPLACEMENT: Status: RESOLVED | Noted: 2018-12-17 | Resolved: 2024-04-22

## 2024-04-22 PROBLEM — L40.50 PSORIATIC ARTHRITIS: Status: ACTIVE | Noted: 2024-04-22

## 2024-04-22 PROBLEM — M19.90 OSTEOARTHRITIS: Status: ACTIVE | Noted: 2017-05-08

## 2024-04-22 PROBLEM — M81.0 OSTEOPOROSIS: Status: ACTIVE | Noted: 2018-06-26

## 2024-04-22 PROBLEM — E03.9 HYPOTHYROIDISM: Status: ACTIVE | Noted: 2017-05-08

## 2024-04-22 PROBLEM — K21.9 GASTROESOPHAGEAL REFLUX DISEASE: Status: ACTIVE | Noted: 2017-05-08

## 2024-04-22 PROCEDURE — 1159F MED LIST DOCD IN RCRD: CPT | Performed by: NURSE PRACTITIONER

## 2024-04-22 PROCEDURE — 3078F DIAST BP <80 MM HG: CPT | Performed by: NURSE PRACTITIONER

## 2024-04-22 PROCEDURE — 99214 OFFICE O/P EST MOD 30 MIN: CPT | Performed by: NURSE PRACTITIONER

## 2024-04-22 PROCEDURE — 1160F RVW MEDS BY RX/DR IN RCRD: CPT | Performed by: NURSE PRACTITIONER

## 2024-04-22 PROCEDURE — 3074F SYST BP LT 130 MM HG: CPT | Performed by: NURSE PRACTITIONER

## 2024-04-22 PROCEDURE — 93000 ELECTROCARDIOGRAM COMPLETE: CPT | Performed by: NURSE PRACTITIONER

## 2024-04-22 RX ORDER — FLUTICASONE PROPIONATE 0.05 MG/G
1 OINTMENT TOPICAL DAILY
COMMUNITY
Start: 2024-01-03

## 2024-04-22 NOTE — PROGRESS NOTES
Chief Complaint  Follow-up, Atrial Fibrillation, Hypertension, and Hyperlipidemia    Subjective            History of Present Illness  Tanya Muñoz is a 78-year-old female patient who presents to the office today for follow-up.  She has atrial fibrillation, hypertension, and mild aortic regurgitation.  She is compliant with her medication.  She denies any chest pain, shortness of breath, lightheadedness/dizziness, palpitations, or edema.    PMH  Past Medical History:   Diagnosis Date    Asthma     Atrial fibrillation     Disease of thyroid gland     Hyperlipidemia          ALLERGY  Allergies   Allergen Reactions    Sulfa Antibiotics Rash          SURGICALHX  Past Surgical History:   Procedure Laterality Date    EYE SURGERY      JOINT REPLACEMENT            SOC  Social History     Socioeconomic History    Marital status:    Tobacco Use    Smoking status: Never    Smokeless tobacco: Never   Vaping Use    Vaping status: Never Used   Substance and Sexual Activity    Alcohol use: Never    Drug use: Never    Sexual activity: Defer         FAMHX  Family History   Problem Relation Age of Onset    Heart attack Brother     Stroke Brother     Heart disease Maternal Grandfather           MEDSIGONLY  Current Outpatient Medications on File Prior to Visit   Medication Sig    albuterol sulfate  (90 Base) MCG/ACT inhaler Every 4 (Four) Hours.    cetirizine (zyrTEC) 10 MG tablet Take 1 tablet by mouth Daily As Needed for Allergies.    Ergocalciferol (VITAMIN D2 PO) Take 1.5 mg by mouth 1 (One) Time Per Week.    fluticasone (CUTIVATE) 0.005 % ointment Apply 1 Application topically to the appropriate area as directed Daily.    levothyroxine (SYNTHROID, LEVOTHROID) 75 MCG tablet Take 1 tablet by mouth Daily.    montelukast (SINGULAIR) 10 MG tablet Take 1 tablet by mouth Every Night.    rivaroxaban (XARELTO) 20 MG tablet Take 1 tablet by mouth Daily.    simvastatin (ZOCOR) 10 MG tablet Take 1 tablet by mouth Every  "Night.    traZODone (DESYREL) 50 MG tablet Take 1 tablet by mouth Every Night.    [DISCONTINUED] budesonide-formoterol (SYMBICORT) 80-4.5 MCG/ACT inhaler Inhale 2 puffs 2 (Two) Times a Day. (Patient not taking: Reported on 4/22/2024)    [DISCONTINUED] mometasone (NASONEX) 50 MCG/ACT nasal spray 2 sprays into the nostril(s) as directed by provider Daily. (Patient not taking: Reported on 4/22/2024)     No current facility-administered medications on file prior to visit.         Objective   /53   Pulse 62   Ht 162.6 cm (64\")   Wt 56.7 kg (125 lb)   BMI 21.46 kg/m²       Physical Exam  HENT:      Head: Normocephalic.   Neck:      Vascular: No carotid bruit.   Cardiovascular:      Rate and Rhythm: Regular rhythm. Bradycardia present.      Pulses: Normal pulses.      Heart sounds: Normal heart sounds. No murmur heard.  Pulmonary:      Effort: Pulmonary effort is normal.      Breath sounds: Normal breath sounds.   Musculoskeletal:      Cervical back: Neck supple.      Right lower leg: No edema.      Left lower leg: No edema.   Skin:     General: Skin is dry.   Neurological:      Mental Status: She is alert and oriented to person, place, and time.   Psychiatric:         Behavior: Behavior normal.           ECG 12 Lead    Date/Time: 4/22/2024 11:45 AM  Performed by: Carlotta Dumont APRN    Authorized by: Carlotta Dumont APRN  Comparison: compared with previous ECG from 2/21/2023  Comparison to previous ECG: No longer in atrial flutter  Rhythm: sinus rhythm  Rate: bradycardic  BPM: 56  Conduction: conduction normal  ST Segments: ST segments normal  T Waves: T waves normal  QRS axis: normal  Other: no other findings    Clinical impression: normal ECG      Result Review :   The following data was reviewed by: SUAD Rod on 04/22/2024:  No results found for: \"PROBNP\"     No results found for: \"TSH\"   No results found for: \"FREET4\"   No results found for: \"DDIMERQUANT\"  No results found for: \"MG\" " "  No results found for: \"DIGOXIN\"   No results found for: \"TROPONINT\"            [id number] Score: 4        Assessment and Plan    Diagnoses and all orders for this visit:    1. Longstanding persistent atrial fibrillation (Primary)  EKG in office today shows normal sinus rhythm with controlled rate, continue Xarelto for CVA prevention.    2. Essential hypertension  Currently controlled and without antihypertensive medication, continue to monitor.    3. Mild aortic regurgitation  Asymptomatic, continue to monitor with repeat echocardiogram.  -     Adult Transthoracic Echo Complete W/ Cont if Necessary Per Protocol; Future    Other orders  -     ECG 12 Lead            Follow Up   Return in about 6 months (around 10/22/2024) for Follow up with Dr Chambers.    Patient was given instructions and counseling regarding her condition or for health maintenance advice. Please see specific information pulled into the AVS if appropriate.     Tanya Muñoz  reports that she has never smoked. She has never used smokeless tobacco.            Carlotta Dumont, APRN  04/22/24  13:02 EDT    Dictated Utilizing Dragon Dictation    "

## 2024-10-21 ENCOUNTER — HOSPITAL ENCOUNTER (OUTPATIENT)
Dept: CARDIOLOGY | Facility: HOSPITAL | Age: 79
Discharge: HOME | End: 2024-10-21
Admitting: NURSE PRACTITIONER
Payer: MEDICARE

## 2024-10-21 DIAGNOSIS — I35.1 MILD AORTIC REGURGITATION: ICD-10-CM

## 2024-10-21 LAB
AORTIC DIMENSIONLESS INDEX: 0.99 (DI)
ASCENDING AORTA: 2.6 CM
BH CV ECHO MEAS - ACS: 1.4 CM
BH CV ECHO MEAS - AO MAX PG: 6.9 MMHG
BH CV ECHO MEAS - AO MEAN PG: 4 MMHG
BH CV ECHO MEAS - AO ROOT DIAM: 2.7 CM
BH CV ECHO MEAS - AO V2 MAX: 131 CM/SEC
BH CV ECHO MEAS - AO V2 VTI: 31.9 CM
BH CV ECHO MEAS - AVA(I,D): 1.99 CM2
BH CV ECHO MEAS - EDV(CUBED): 64 ML
BH CV ECHO MEAS - EDV(MOD-SP2): 62.9 ML
BH CV ECHO MEAS - EDV(MOD-SP4): 72.3 ML
BH CV ECHO MEAS - EF(MOD-BP): 69.9 %
BH CV ECHO MEAS - EF(MOD-SP2): 66.5 %
BH CV ECHO MEAS - EF(MOD-SP4): 72.5 %
BH CV ECHO MEAS - ESV(CUBED): 10.6 ML
BH CV ECHO MEAS - ESV(MOD-SP2): 21.1 ML
BH CV ECHO MEAS - ESV(MOD-SP4): 19.9 ML
BH CV ECHO MEAS - FS: 45 %
BH CV ECHO MEAS - IVS/LVPW: 1.5 CM
BH CV ECHO MEAS - IVSD: 1.2 CM
BH CV ECHO MEAS - LA DIMENSION: 3.5 CM
BH CV ECHO MEAS - LAT PEAK E' VEL: 10.4 CM/SEC
BH CV ECHO MEAS - LV MASS(C)D: 127.1 GRAMS
BH CV ECHO MEAS - LV MAX PG: 6.1 MMHG
BH CV ECHO MEAS - LV MEAN PG: 3 MMHG
BH CV ECHO MEAS - LV V1 MAX: 123 CM/SEC
BH CV ECHO MEAS - LV V1 VTI: 31.6 CM
BH CV ECHO MEAS - LVIDD: 4 CM
BH CV ECHO MEAS - LVIDS: 2.2 CM
BH CV ECHO MEAS - LVOT AREA: 2.01 CM2
BH CV ECHO MEAS - LVOT DIAM: 1.6 CM
BH CV ECHO MEAS - LVPWD: 0.8 CM
BH CV ECHO MEAS - MED PEAK E' VEL: 8.6 CM/SEC
BH CV ECHO MEAS - MR MAX PG: 99.2 MMHG
BH CV ECHO MEAS - MR MAX VEL: 498 CM/SEC
BH CV ECHO MEAS - MV A MAX VEL: 85.7 CM/SEC
BH CV ECHO MEAS - MV DEC SLOPE: 657 CM/SEC2
BH CV ECHO MEAS - MV DEC TIME: 0.28 SEC
BH CV ECHO MEAS - MV E MAX VEL: 151 CM/SEC
BH CV ECHO MEAS - MV E/A: 1.76
BH CV ECHO MEAS - MV MEAN PG: 3 MMHG
BH CV ECHO MEAS - MV P1/2T: 72.7 MSEC
BH CV ECHO MEAS - MV V2 VTI: 60.4 CM
BH CV ECHO MEAS - MVA(P1/2T): 3 CM2
BH CV ECHO MEAS - MVA(VTI): 1.05 CM2
BH CV ECHO MEAS - PA V2 MAX: 97 CM/SEC
BH CV ECHO MEAS - PULM A REVS DUR: 0.14 SEC
BH CV ECHO MEAS - PULM A REVS VEL: 27.2 CM/SEC
BH CV ECHO MEAS - PULM DIAS VEL: 52.3 CM/SEC
BH CV ECHO MEAS - PULM S/D: 0.94
BH CV ECHO MEAS - PULM SYS VEL: 49.3 CM/SEC
BH CV ECHO MEAS - QP/QS: 0.97
BH CV ECHO MEAS - RAP SYSTOLE: 5 MMHG
BH CV ECHO MEAS - RV MAX PG: 3.8 MMHG
BH CV ECHO MEAS - RV V1 MAX: 97.4 CM/SEC
BH CV ECHO MEAS - RV V1 VTI: 24.3 CM
BH CV ECHO MEAS - RVDD: 2.9 CM
BH CV ECHO MEAS - RVOT DIAM: 1.8 CM
BH CV ECHO MEAS - RVSP: 40.3 MMHG
BH CV ECHO MEAS - SV(LVOT): 63.5 ML
BH CV ECHO MEAS - SV(MOD-SP2): 41.8 ML
BH CV ECHO MEAS - SV(MOD-SP4): 52.4 ML
BH CV ECHO MEAS - SV(RVOT): 61.8 ML
BH CV ECHO MEAS - TAPSE (>1.6): 1.83 CM
BH CV ECHO MEAS - TR MAX PG: 35.3 MMHG
BH CV ECHO MEAS - TR MAX VEL: 297 CM/SEC
BH CV ECHO MEASUREMENTS AVERAGE E/E' RATIO: 15.89
BH CV XLRA - TDI S': 12.2 CM/SEC
IVRT: 55 MS
LEFT ATRIUM VOLUME INDEX: 25.9 ML/M2

## 2024-10-21 PROCEDURE — 93306 TTE W/DOPPLER COMPLETE: CPT | Performed by: INTERNAL MEDICINE

## 2024-10-21 PROCEDURE — 93306 TTE W/DOPPLER COMPLETE: CPT

## 2024-10-22 ENCOUNTER — TELEPHONE (OUTPATIENT)
Dept: CARDIOLOGY | Facility: CLINIC | Age: 79
End: 2024-10-22
Payer: MEDICARE

## 2024-10-22 NOTE — TELEPHONE ENCOUNTER
----- Message from Carlotta Tim sent at 10/21/2024 11:58 PM EDT -----  Echocardiogram shows heart muscle is functioning normally. There is mild mitral and aortic regurgitation murmurs present which will be monitored with repeat echo in 2-3 years. Follow up as scheduled

## 2024-10-24 ENCOUNTER — OFFICE VISIT (OUTPATIENT)
Dept: CARDIOLOGY | Facility: CLINIC | Age: 79
End: 2024-10-24
Payer: MEDICARE

## 2024-10-24 VITALS
SYSTOLIC BLOOD PRESSURE: 116 MMHG | BODY MASS INDEX: 22.33 KG/M2 | WEIGHT: 130.8 LBS | HEART RATE: 55 BPM | HEIGHT: 64 IN | DIASTOLIC BLOOD PRESSURE: 49 MMHG

## 2024-10-24 DIAGNOSIS — E78.2 MIXED HYPERLIPIDEMIA: ICD-10-CM

## 2024-10-24 DIAGNOSIS — I48.11 LONGSTANDING PERSISTENT ATRIAL FIBRILLATION: Primary | ICD-10-CM

## 2024-10-24 DIAGNOSIS — I35.1 MILD AORTIC REGURGITATION: ICD-10-CM

## 2024-10-24 NOTE — ASSESSMENT & PLAN NOTE
Patient is well rate controlled and stable with symptoms continue with xaerlto 20 mg qd for cva prevention

## 2024-10-24 NOTE — PROGRESS NOTES
"Chief Complaint  Follow-up, Atrial Fibrillation, Hyperlipidemia, and Hypertension    Subjective    Patient has been doing well denies any issues with tachycardia and changes in breathing .      Past Medical History:   Diagnosis Date    Asthma     Atrial fibrillation     Disease of thyroid gland     Hyperlipidemia          Current Outpatient Medications:     albuterol sulfate  (90 Base) MCG/ACT inhaler, Every 4 (Four) Hours., Disp: , Rfl:     cetirizine (zyrTEC) 10 MG tablet, Take 1 tablet by mouth Daily As Needed for Allergies., Disp: , Rfl:     Ergocalciferol (VITAMIN D2 PO), Take 1.5 mg by mouth 1 (One) Time Per Week., Disp: , Rfl:     fluticasone (CUTIVATE) 0.005 % ointment, Apply 1 Application topically to the appropriate area as directed Daily., Disp: , Rfl:     levothyroxine (SYNTHROID, LEVOTHROID) 75 MCG tablet, Take 1 tablet by mouth Daily., Disp: , Rfl:     montelukast (SINGULAIR) 10 MG tablet, Take 1 tablet by mouth Every Night., Disp: , Rfl:     rivaroxaban (XARELTO) 20 MG tablet, Take 1 tablet by mouth Daily., Disp: 30 tablet, Rfl: 0    simvastatin (ZOCOR) 10 MG tablet, Take 1 tablet by mouth Every Night., Disp: , Rfl:     traZODone (DESYREL) 50 MG tablet, Take 1 tablet by mouth Every Night., Disp: , Rfl:     There are no discontinued medications.  Allergies   Allergen Reactions    Sulfa Antibiotics Rash        Social History     Tobacco Use    Smoking status: Never    Smokeless tobacco: Never   Vaping Use    Vaping status: Never Used   Substance Use Topics    Alcohol use: Never    Drug use: Never       Family History   Problem Relation Age of Onset    Heart attack Brother     Stroke Brother     Heart disease Maternal Grandfather         Objective     /49   Pulse 55   Ht 162.6 cm (64\")   Wt 59.3 kg (130 lb 12.8 oz)   BMI 22.45 kg/m²       Physical Exam    General Appearance:   no acute distress  Alert and oriented x3  HENT:   lips not cyanotic  Atraumatic  Neck:  No jvd " "  supple  Respiratory:  no respiratory distress  normal breath sounds  no rales  Cardiovascular:  Irregularly irregular  no S3, no S4   no murmur  no rub  Extremities  No cyanosis  lower extremity edema: none    Skin:   warm, dry  No rashes      Result Review :     No results found for: \"PROBNP\"                                             No results found for: \"TSH\"   No results found for: \"FREET4\"   No results found for: \"DDIMERQUANT\"  No results found for: \"MG\"   No results found for: \"DIGOXIN\"   No results found for: \"TROPONINT\"          No results found for: \"POCTROP\"    Results for orders placed during the hospital encounter of 10/21/24    Adult Transthoracic Echo Complete W/ Cont if Necessary Per Protocol    Interpretation Summary    Left ventricular systolic function is normal. Calculated left ventricular EF = 69.9%    Left ventricular wall thickness is consistent with mild asymmetric hypertrophy.    Left ventricular diastolic function is consistent with (grade Ia w/high LAP) impaired relaxation.    The left atrial cavity is mildly dilated.    Estimated right ventricular systolic pressure from tricuspid regurgitation is mildly elevated (35-45 mmHg).    Mild mitral regurgitation    Mild aortic insufficiency                 Diagnoses and all orders for this visit:    1. Longstanding persistent atrial fibrillation (Primary)  Assessment & Plan:  Patient is well rate controlled and stable with symptoms continue with xaerlto 20 mg qd for cva prevention       2. Mild aortic regurgitation    3. Mixed hyperlipidemia  Assessment & Plan:  Continue with zocor 10mg qd at goal.                Follow Up     Return in about 1 year (around 10/24/2025) for Follow with Carlotta Dumont, EKG with F/U.          Patient was given instructions and counseling regarding her condition or for health maintenance advice. Please see specific information pulled into the AVS if appropriate.     2  "

## 2025-06-10 ENCOUNTER — TELEPHONE (OUTPATIENT)
Dept: CARDIOLOGY | Facility: CLINIC | Age: 80
End: 2025-06-10
Payer: MEDICARE

## 2025-06-10 NOTE — TELEPHONE ENCOUNTER
----- Message from Carlotta Dumont sent at 6/9/2025  9:43 PM EDT -----  proBNP is mildly elevated, find out if she is having any new or worsening shortness of breath/edema  ----- Message -----  From: Cecy Lora RegSched Rep  Sent: 6/6/2025   3:10 PM EDT  To: SUAD Steele

## 2025-06-11 NOTE — TELEPHONE ENCOUNTER
DLEROY patient regarding results. Voiced understanding.     Patient denies any edema/SOA. Advised if symptoms develop to notify office